# Patient Record
Sex: MALE | Race: ASIAN | NOT HISPANIC OR LATINO | Employment: PART TIME | URBAN - METROPOLITAN AREA
[De-identification: names, ages, dates, MRNs, and addresses within clinical notes are randomized per-mention and may not be internally consistent; named-entity substitution may affect disease eponyms.]

---
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
## 2022-09-13 ENCOUNTER — OFFICE VISIT (OUTPATIENT)
Dept: FAMILY MEDICINE | Facility: CLINIC | Age: 22
End: 2022-09-13
Payer: COMMERCIAL

## 2022-09-13 VITALS
HEIGHT: 69 IN | WEIGHT: 191 LBS | SYSTOLIC BLOOD PRESSURE: 128 MMHG | HEART RATE: 60 BPM | DIASTOLIC BLOOD PRESSURE: 70 MMHG | BODY MASS INDEX: 28.29 KG/M2 | RESPIRATION RATE: 16 BRPM | TEMPERATURE: 97.5 F

## 2022-09-13 DIAGNOSIS — Z00.00 ROUTINE GENERAL MEDICAL EXAMINATION AT A HEALTH CARE FACILITY: Primary | ICD-10-CM

## 2022-09-13 PROCEDURE — 90715 TDAP VACCINE 7 YRS/> IM: CPT | Performed by: NURSE PRACTITIONER

## 2022-09-13 PROCEDURE — 99385 PREV VISIT NEW AGE 18-39: CPT | Mod: 25 | Performed by: NURSE PRACTITIONER

## 2022-09-13 PROCEDURE — 90471 IMMUNIZATION ADMIN: CPT | Performed by: NURSE PRACTITIONER

## 2022-09-13 ASSESSMENT — ENCOUNTER SYMPTOMS
HEARTBURN: 0
SHORTNESS OF BREATH: 0
HEMATOCHEZIA: 0
ARTHRALGIAS: 0
NAUSEA: 0
NERVOUS/ANXIOUS: 0
EYE PAIN: 0
CHILLS: 0
CONSTIPATION: 0
DYSURIA: 0
FREQUENCY: 0
WEAKNESS: 0
HEMATURIA: 0
JOINT SWELLING: 0
MYALGIAS: 0
COUGH: 0
PARESTHESIAS: 0
SORE THROAT: 0
FEVER: 0
DIARRHEA: 0
PALPITATIONS: 0
ABDOMINAL PAIN: 0
HEADACHES: 0
DIZZINESS: 0

## 2022-09-13 ASSESSMENT — PAIN SCALES - GENERAL: PAINLEVEL: NO PAIN (0)

## 2022-09-13 NOTE — NURSING NOTE
Prior to immunization administration, verified patients identity using patient s name and date of birth. Please see Immunization Activity for additional information.     Screening Questionnaire for Adult Immunization    Are you sick today?   No   Do you have allergies to medications, food, a vaccine component or latex?   No   Have you ever had a serious reaction after receiving a vaccination?   No   Do you have a long-term health problem with heart, lung, kidney, or metabolic disease (e.g., diabetes), asthma, a blood disorder, no spleen, complement component deficiency, a cochlear implant, or a spinal fluid leak?  Are you on long-term aspirin therapy?   No   Do you have cancer, leukemia, HIV/AIDS, or any other immune system problem?   No   Do you have a parent, brother, or sister with an immune system problem?   No   In the past 3 months, have you taken medications that affect  your immune system, such as prednisone, other steroids, or anticancer drugs; drugs for the treatment of rheumatoid arthritis, Crohn s disease, or psoriasis; or have you had radiation treatments?   No   Have you had a seizure, or a brain or other nervous system problem?   No   During the past year, have you received a transfusion of blood or blood    products, or been given immune (gamma) globulin or antiviral drug?   No   For women: Are you pregnant or is there a chance you could become       pregnant during the next month?   No   Have you received any vaccinations in the past 4 weeks?   No     Immunization questionnaire answers were all negative.        Per orders of BARRON Sanchez , injection of tdap given by Ailyn Vasquez CMA. Patient instructed to remain in clinic for 15 minutes afterwards, and to report any adverse reaction to me immediately.       Screening performed by Ailyn Vasquez CMA on 9/13/2022 at 11:17 AM.

## 2022-09-13 NOTE — LETTER
[facility]  [address] [place] [id number]  Phone: [phone]  Fax: [phone]    September 13, 2022        Yariel Hartmann  [facility] [place] [id number]          To whom it may concern:    RE: Yariel Hartmann    Patient was seen and treated today at our clinic for a physical exam.    Please contact me for questions or concerns.      Sincerely,        CHELA Contreras CNP

## 2022-09-13 NOTE — PROGRESS NOTES
SUBJECTIVE:   CC: Yariel Hartmann is an 21 year old male who presents for preventative health visit.       Patient has been advised of split billing requirements and indicates understanding: Yes  Healthy Habits:     Getting at least 3 servings of Calcium per day:  Yes    Bi-annual eye exam:  NO    Dental care twice a year:  NO    Sleep apnea or symptoms of sleep apnea:  None    Diet:  Regular (no restrictions)    Frequency of exercise:  None    Taking medications regularly:  Yes    Medication side effects:  None    PHQ-2 Total Score: 0    Additional concerns today:  No          Today's PHQ-2 Score:   PHQ-2 ( 1999 Pfizer) 9/13/2022   Q1: Little interest or pleasure in doing things 0   Q2: Feeling down, depressed or hopeless 0   PHQ-2 Score 0   Q1: Little interest or pleasure in doing things Not at all   Q2: Feeling down, depressed or hopeless Not at all   PHQ-2 Score 0       Abuse: Current or Past(Physical, Sexual or Emotional)- No  Do you feel safe in your environment? Yes    Have you ever done Advance Care Planning? (For example, a Health Directive, POLST, or a discussion with a medical provider or your loved ones about your wishes): No, advance care planning information given to patient to review.  Patient declined advance care planning discussion at this time.    Social History     Tobacco Use     Smoking status: Never Smoker     Smokeless tobacco: Never Used   Substance Use Topics     Alcohol use: No     If you drink alcohol do you typically have >3 drinks per day or >7 drinks per week? No    Alcohol Use 9/13/2022   Prescreen: >3 drinks/day or >7 drinks/week? No   Prescreen: >3 drinks/day or >7 drinks/week? -       Last PSA: No results found for: PSA    Reviewed orders with patient. Reviewed health maintenance and updated orders accordingly - Yes  Labs reviewed in EPIC  BP Readings from Last 3 Encounters:   09/13/22 128/70   12/20/18 120/76   02/12/18 142/74    Wt Readings from Last 3 Encounters:   09/13/22  86.6 kg (191 lb)   12/20/18 79.4 kg (175 lb) (79 %, Z= 0.79)*   02/12/18 81.8 kg (180 lb 6.4 oz) (85 %, Z= 1.06)*     * Growth percentiles are based on [facility] (Boys, 2-20 Years) data.                  Patient Active Problem List   Diagnosis     Enlarged tonsils     History reviewed. No pertinent surgical history.    Social History     Tobacco Use     Smoking status: Never Smoker     Smokeless tobacco: Never Used   Substance Use Topics     Alcohol use: Yes     Family History   Problem Relation Age of Onset     Thyroid Disease Mother      Anemia Mother      Hypertension Maternal Grandmother      Hepatitis Maternal Grandmother         c     Hypertension Maternal Grandfather      Hypertension Paternal Grandmother      Hypertension Paternal Grandfather          Current Outpatient Medications   Medication Sig Dispense Refill     VITAMIN D, CHOLECALCIFEROL, PO Take 5,000 Units by mouth daily        No Known Allergies  Recent Labs   Lab Test 01/13/16  1522   CR 0.74   GFRESTIMATED >90  Non  GFR Calc     GFRESTBLACK >90   GFR Calc     POTASSIUM 4.4        Reviewed and updated as needed this visit by clinical staff                    Reviewed and updated as needed this visit by Provider                   History reviewed. No pertinent past medical history.   History reviewed. No pertinent surgical history.  OB History   No obstetric history on file.       Review of Systems   Constitutional: Negative for chills and fever.   HENT: Negative for congestion, ear pain, hearing loss and sore throat.    Eyes: Negative for pain and visual disturbance.   Respiratory: Negative for cough and shortness of breath.    Cardiovascular: Negative for chest pain, palpitations and peripheral edema.   Gastrointestinal: Negative for abdominal pain, constipation, diarrhea, heartburn, hematochezia and nausea.   Genitourinary: Negative for dysuria, frequency, genital sores, hematuria, impotence, penile discharge and  "urgency.   Musculoskeletal: Negative for arthralgias, joint swelling and myalgias.   Skin: Negative for rash.   Neurological: Negative for dizziness, weakness, headaches and paresthesias.   Psychiatric/Behavioral: Negative for mood changes. The patient is not nervous/anxious.          OBJECTIVE:   /70 (BP Location: Right arm, Cuff Size: Adult Large)   Pulse 60   Temp 97.5  F (36.4  C) (Tympanic)   Resp 16   Ht 1.759 m (5' 9.25\")   Wt 86.6 kg (191 lb)   BMI 28.00 kg/m      Physical Exam  GENERAL: healthy, alert and no distress  EYES: Eyes grossly normal to inspection, PERRL and conjunctivae and sclerae normal  HENT: ear canals and TM's normal, nose and mouth without ulcers or lesions  NECK: no adenopathy, no asymmetry, masses, or scars and thyroid normal to palpation  RESP: lungs clear to auscultation - no rales, rhonchi or wheezes  CV: regular rate and rhythm, normal S1 S2, no S3 or S4, no murmur, click or rub, no peripheral edema and peripheral pulses strong  ABDOMEN: soft, nontender, no hepatosplenomegaly, no masses and bowel sounds normal  MS: no gross musculoskeletal defects noted, no edema  SKIN: no suspicious lesions or rashes  NEURO: Normal strength and tone, mentation intact and speech normal  PSYCH: mentation appears normal, affect normal/bright    Diagnostic Test Results:  Labs reviewed in Epic  No results found for any visits on 09/13/22.    ASSESSMENT/PLAN:   Yariel was seen today for physical.    Diagnoses and all orders for this visit:    Routine general medical examination at a health care facility    Other orders  -     TDAP VACCINE (Adacel, Boostrix)  [[id number]]        Patient has been advised of split billing requirements and indicates understanding: Yes    COUNSELING:   Reviewed preventive health counseling, as reflected in patient instructions       Regular exercise       Healthy diet/nutrition       Vision screening       Hearing screening       Consider Hep C screening for all " "patients one time for ages 18-79 years       HIV screeninx in teen years, 1x in adult years, and at intervals if high risk    Estimated body mass index is 25.84 kg/m  as calculated from the following:    Height as of 18: 1.753 m (5' 9\").    Weight as of 18: 79.4 kg (175 lb).         He reports that he has never smoked. He has never used smokeless tobacco.      Counseling Resources:  ATP IV Guidelines  Pooled Cohorts Equation Calculator  FRAX Risk Assessment  ICSI Preventive Guidelines  Dietary Guidelines for Americans, 2010  USDA's MyPlate  ASA Prophylaxis  Lung CA Screening    Kelly Sanchez, CHELA CNP  [facility]  "

## 2022-12-13 ENCOUNTER — NURSE TRIAGE (OUTPATIENT)
Dept: NURSING | Facility: CLINIC | Age: 22
End: 2022-12-13

## 2022-12-13 NOTE — TELEPHONE ENCOUNTER
Pt tested positive for COVID on Sun via home test. Pt started having SXs Sat evening; body aches, cough, nasal congestion and drainage, and general malaise. BMI 28.    Advised HC and transferred to COVID sched for VV to discuss TX options.    Charity Rodriguez, RN, BSN  [facility] Nurse Advisor 11:31 AM 12/13/2022      Reason for Disposition    HIGH RISK for severe COVID complications (e.g., weak immune system, age > 64 years, obesity with BMI > 25, pregnant, chronic lung disease or other chronic medical condition) (Exception: Already seen by PCP and no new or worsening symptoms.)    Additional Information    Negative: SEVERE difficulty breathing (e.g., struggling for each breath, speaks in single words)    Negative: Difficult to awaken or acting confused (e.g., disoriented, slurred speech)    Negative: Bluish (or gray) lips or face now    Negative: Shock suspected (e.g., cold/pale/clammy skin, too weak to stand, low BP, rapid pulse)    Negative: Sounds like a life-threatening emergency to the triager    Negative: [1] Diagnosed or suspected COVID-19 AND [2] symptoms lasting 3 or more weeks    Negative: [1] COVID-19 exposure AND [2] no symptoms    Negative: COVID-19 vaccine reaction suspected (e.g., fever, headache, muscle aches) occurring 1 to 3 days after getting vaccine    Negative: COVID-19 vaccine, questions about    Negative: [1] Lives with someone known to have influenza (flu test positive) AND [2] flu-like symptoms (e.g., cough, runny nose, sore throat, SOB; with or without fever)    Negative: [1] Adult with possible COVID-19 symptoms AND [2] triager concerned about severity of symptoms or other causes    Negative: COVID-19 and breastfeeding, questions about    Negative: SEVERE or constant chest pain or pressure  (Exception: Mild central chest pain, present only when coughing.)    Negative: MODERATE difficulty breathing (e.g., speaks in phrases, SOB even at rest, pulse 100-120)    Negative: Headache and  stiff neck (can't touch chin to chest)    Negative: Oxygen level (e.g., pulse oximetry) 90 percent or lower    Negative: Chest pain or pressure    Negative: Patient sounds very sick or weak to the triager    Negative: MILD difficulty breathing (e.g., minimal/no SOB at rest, SOB with walking, pulse <100)    Negative: Fever > 103 F (39.4 C)    Negative: [1] Fever > 101 F (38.3 C) AND [2] over 60 years of age    Negative: [1] Fever > 100.0 F (37.8 C) AND [2] bedridden (e.g., nursing home patient, CVA, chronic illness, recovering from surgery)    Protocols used: CORONAVIRUS (COVID-19) DIAGNOSED OR [id number] 1.18.2022

## 2022-12-14 ENCOUNTER — VIRTUAL VISIT (OUTPATIENT)
Dept: FAMILY MEDICINE | Facility: CLINIC | Age: 22
End: 2022-12-14
Payer: COMMERCIAL

## 2022-12-14 DIAGNOSIS — U07.1 INFECTION DUE TO 2019 NOVEL CORONAVIRUS: Primary | ICD-10-CM

## 2022-12-14 PROCEDURE — 99213 OFFICE O/P EST LOW 20 MIN: CPT | Mod: CS | Performed by: FAMILY MEDICINE

## 2022-12-14 NOTE — LETTER
December 14, 2022      Yariel Hartmann  [address]   [place] [address]        To Whom It May Concern:    Yariel Hartmann was seen on 12/14/22.  Please excuse him 12/12/22 to 12/16/22 due to Covid 19 illness.        Sincerely,        Don Fernandez MD

## 2022-12-14 NOTE — PROGRESS NOTES
Yariel is a 22 year old who is being evaluated via a billable video visit.      How would you like to obtain your AVS? MyChart  If the video visit is dropped, the invitation should be resent by: Text to cell phone: [phone]  Will anyone else be joining your video visit? No          Assessment & Plan     Infection due to 2019 novel coronavirus  EHR reviewed.   Past medical history, problem list, past surgical history, family history, social history, medications reviewed, updated, reconciled.   Reviewed natural course of covid 19, possible complications, supportive care at home.   Note provided for work. Needs to be absent until Saturday.   Reviewed reasons to follow up or be seen urgently.         Return for Routine preventive.    Don Fernandez MD  [facility]   Yariel is a 22 year old presenting for the following health issues:  covid (Positive on sunday)      HPI   Started feeling sick on Saturday maybe, tested on Sunday morning. Did a couple tests and positive for covid 19. He notes significant drainage and nasal congestion. No fever. No cough. Talked with his work and needs a note to be excused. Was told to be away the whole week.   No trouble breathing.   Does not think he needs any treatment right now.     COVID-19 Symptom Review  How many days ago did these symptoms start? Saturday night/late Sunday     Are any of the following symptoms significant for you?    New or worsening difficulty breathing? No    Worsening cough? Yes, I am coughing up mucus.    Fever or chills? No    Headache: No    Sore throat: No    Chest pain: No    Diarrhea: No    Body aches? No    What treatments has patient tried? Acetaminophen   Does patient live in a nursing home, group home, or shelter? No  Does patient have a way to get food/medications during quarantined? Yes, I have a friend or family member who can help me.                    Objective    Vitals - Patient Reported  Weight (Patient  "Reported): 86.2 kg (190 lb)  Height (Patient Reported): 177.8 cm (5' 10\")  BMI (Based on Pt Reported Ht/Wt): 27.26        Physical Exam   GENERAL: Healthy, alert and no distress  EYES: Eyes grossly normal to inspection.  No discharge or erythema, or obvious scleral/conjunctival abnormalities.  RESP: No audible wheeze, cough, or visible cyanosis.  No visible retractions or increased work of breathing.    SKIN: Visible skin clear. No significant rash, abnormal pigmentation or lesions.  NEURO: Cranial nerves grossly intact.  Mentation and speech appropriate for age.  PSYCH: Mentation appears normal, affect normal/bright, judgement and insight intact, normal speech and appearance well-groomed.          Video-Visit Details    Video Start Time: 7:20    Type of service:  Video Visit    Video End Time:7:30    Originating Location (pt. Location): Home  Distant Location (provider location):  On-site    Platform used for Video Visit: Niki    "

## 2022-12-18 ENCOUNTER — HEALTH MAINTENANCE LETTER (OUTPATIENT)
Age: 22
End: 2022-12-18

## 2023-04-17 ENCOUNTER — OFFICE VISIT (OUTPATIENT)
Dept: FAMILY MEDICINE | Facility: CLINIC | Age: 23
End: 2023-04-17
Payer: COMMERCIAL

## 2023-04-17 VITALS
OXYGEN SATURATION: 98 % | DIASTOLIC BLOOD PRESSURE: 68 MMHG | BODY MASS INDEX: 28.73 KG/M2 | HEIGHT: 69 IN | RESPIRATION RATE: 16 BRPM | WEIGHT: 194 LBS | TEMPERATURE: 97.2 F | SYSTOLIC BLOOD PRESSURE: 118 MMHG | HEART RATE: 66 BPM

## 2023-04-17 DIAGNOSIS — M62.830 BACK MUSCLE SPASM: ICD-10-CM

## 2023-04-17 DIAGNOSIS — S29.012A UPPER BACK STRAIN, INITIAL ENCOUNTER: ICD-10-CM

## 2023-04-17 DIAGNOSIS — S39.012A BACK STRAIN, INITIAL ENCOUNTER: Primary | ICD-10-CM

## 2023-04-17 PROCEDURE — 99213 OFFICE O/P EST LOW 20 MIN: CPT | Performed by: FAMILY MEDICINE

## 2023-04-17 RX ORDER — CYCLOBENZAPRINE HCL 10 MG
5-10 TABLET ORAL 3 TIMES DAILY PRN
Qty: 20 TABLET | Refills: 0 | Status: SHIPPED | OUTPATIENT
Start: 2023-04-17 | End: 2023-04-17

## 2023-04-17 RX ORDER — CYCLOBENZAPRINE HCL 10 MG
5-10 TABLET ORAL
Qty: 20 TABLET | Refills: 0 | Status: SHIPPED | OUTPATIENT
Start: 2023-04-17

## 2023-04-17 ASSESSMENT — PAIN SCALES - GENERAL: PAINLEVEL: MODERATE PAIN (5)

## 2023-04-17 ASSESSMENT — ENCOUNTER SYMPTOMS: BACK PAIN: 1

## 2023-04-17 NOTE — PATIENT INSTRUCTIONS
Please use over the counter pain reliever.    Use acetaminophen 500 mg tabs, 1-2 tabs every 6 hours as needed, remember not to use more than 4,000mg in 24 hours and to include other medications that may have acetaminophen.    OR    Use ibuprofen 200mg 3-4 tabs every 8 hours as needed, take with food.    You may use also a muscle relaxant use cyclobenzaprine medication 10 mg at night.    Please go to physical therapy.            Thank you for choosing [facility].  You may be receiving an email and/or telephone survey request from [facility] Customer Experience regarding your visit today.  Please take a few minutes to respond to the survey to let us know how we are doing.      If you have questions or concerns, please contact us via [employer] or you can contact your care team at [phone] option 2.    Our Clinic hours are:  Monday - Thursday 7am-6pm  Friday 7am-5pm    The [place] outpatient lab hours are:  Monday - Friday 7am-4:30pm    Appointments are required, call [phone]    If you have clinical questions after hours or would like to schedule an appointment,  call the clinic at [phone].

## 2023-04-17 NOTE — PROGRESS NOTES
"  Assessment & Plan     Back strain, initial encounter  New diagnosis.  Made recommendations for PT, OTC pain med, and muscle relaxant.  He will think about reporting this to his employer.  If so he will need to run referral to PT through   - Physical Therapy Referral; Future    Upper back strain, initial encounter  As above  - Physical Therapy Referral; Future    Back muscle spasm  Use med only at night.  - cyclobenzaprine (FLEXERIL) 10 MG tablet; Take 0.5-1 tablets (5-10 mg) by mouth 3 times daily as needed for muscle spasms             BMI:   Estimated body mass index is 28.34 kg/m  as calculated from the following:    Height as of this encounter: 1.762 m (5' 9.37\").    Weight as of this encounter: 88 kg (194 lb).           Augusto Forrester MD  [facility]    Ac Saldivar is a 22 year old, presenting for the following health issues:  Back Pain (Mid to low back. WORK COMP. /)        4/17/2023     1:44 PM   Additional Questions   Roomed by Korina Cummings MA   Accompanied by Self         4/17/2023     1:44 PM   Patient Reported Additional Medications   Patient reports taking the following new medications no new meds     Back Pain     History of Present Illness       Back Pain:  He presents for follow up of back pain. Patient's back pain is a new problem.    Original cause of back pain: work related injury  First noticed back pain: 1-4 weeks ago  Patient feels back pain: constantlyLocation of back pain:  Right lower back, left lower back, right middle of back and left middle of back  Description of back pain: cramping, dull ache, sharp and shooting  Back pain spreads: nowhere    Since patient first noticed back pain, pain is: always present, but gets better and worse  Does back pain interfere with his job:  Yes  On a scale of 1-10 (10 being the worst), patient describes pain as:  6  What makes back pain worse: bending, certain positions, lying down, stress and " "twisting  Acupuncture: not tried  Acetaminophen: not tried  Activity or exercise: helpful  Chiropractor:  Not tried  Cold: not tried  Heat: not helpful  Massage: not tried  Muscle relaxants: not tried  NSAIDS: not tried  Opioids: not tried  Physical Therapy: not tried  Rest: helpful  Steroid Injection: not tried  Stretching: helpful  Surgery: not tried  TENS unit: not tried  Topical pain relievers: helpful  Other healthcare providers patient is seeing for back pain: None    He eats 0-1 servings of fruits and vegetables daily.He consumes 1 sweetened beverage(s) daily.He exercises with enough effort to increase his heart rate 30 to 60 minutes per day.  He exercises with enough effort to increase his heart rate 3 or less days per week.   He is taking medications regularly.       Patient does not have a specific DOI.  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  Thinks it was due to this work.  He continues to have mild pain the mid thoracic region to upper lumbar.  No radicular symptoms.  No GI or  symptoms.  He used acetaminophen once.  He feels stiff in the morning, better during the day and gets tight at night before bed.  Has not done PT.  Did not report this to work even though he thinks his injury maybe related to work.         Review of Systems   Musculoskeletal: Positive for back pain.            Objective    /68 (BP Location: Right arm, Patient Position: Sitting, Cuff Size: Adult Large)   Pulse 66   Temp 97.2  F (36.2  C) (Tympanic)   Resp 16   Ht 1.762 m (5' 9.37\")   Wt 88 kg (194 lb)   SpO2 98%   BMI 28.34 kg/m    Body mass index is 28.34 kg/m .  Physical Exam   Patient is pleasant, cooperative and in no acute distress.  Back:  Yariel Hartmann had no difficulty moving from the chair to the exam table.  mild tenderness to palpation in the mid thoracic to upper lumbar region.  No skin changes to the area.    no tenderness to the buttock area.  Straight leg " raise was neg.  Flexion at hip was 60 degrees. Able to hyperextend and lean from side to side.  Muscle/Skeletal:  Strength was 5/5 of lower extremities.  Neuro: Reflexes were 2/4 bilaterally at patella and Achilles.

## 2023-11-19 ENCOUNTER — HEALTH MAINTENANCE LETTER (OUTPATIENT)
Age: 23
End: 2023-11-19

## 2024-12-29 ENCOUNTER — HEALTH MAINTENANCE LETTER (OUTPATIENT)
Age: 24
End: 2024-12-29